# Patient Record
Sex: MALE
[De-identification: names, ages, dates, MRNs, and addresses within clinical notes are randomized per-mention and may not be internally consistent; named-entity substitution may affect disease eponyms.]

---

## 2018-06-20 ENCOUNTER — HOSPITAL ENCOUNTER (EMERGENCY)
Dept: HOSPITAL 97 - ER | Age: 38
Discharge: HOME | End: 2018-06-20
Payer: SELF-PAY

## 2018-06-20 VITALS — TEMPERATURE: 99.2 F

## 2018-06-20 VITALS — OXYGEN SATURATION: 97 % | DIASTOLIC BLOOD PRESSURE: 87 MMHG | SYSTOLIC BLOOD PRESSURE: 131 MMHG

## 2018-06-20 DIAGNOSIS — S16.1XXA: Primary | ICD-10-CM
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
## 2018-06-20 DIAGNOSIS — F17.200: ICD-10-CM

## 2018-06-20 DIAGNOSIS — E11.9: ICD-10-CM
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
## 2018-06-20 DIAGNOSIS — M25.521: ICD-10-CM

## 2018-06-20 PROCEDURE — 99284 EMERGENCY DEPT VISIT MOD MDM: CPT

## 2018-06-20 PROCEDURE — 96372 THER/PROPH/DIAG INJ SC/IM: CPT

## 2018-06-20 PROCEDURE — 72050 X-RAY EXAM NECK SPINE 4/5VWS: CPT
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
## 2018-08-20 ENCOUNTER — HOSPITAL ENCOUNTER (EMERGENCY)
Dept: HOSPITAL 97 - ER | Age: 38
Discharge: HOME | End: 2018-08-20
Payer: SELF-PAY

## 2018-08-20 VITALS — SYSTOLIC BLOOD PRESSURE: 118 MMHG | OXYGEN SATURATION: 96 % | DIASTOLIC BLOOD PRESSURE: 73 MMHG

## 2018-08-20 VITALS — TEMPERATURE: 97.8 F

## 2018-08-20 DIAGNOSIS — M79.631: ICD-10-CM
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
## 2018-08-20 DIAGNOSIS — R07.9: Primary | ICD-10-CM
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
## 2018-08-20 DIAGNOSIS — E11.9: ICD-10-CM
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
## 2018-08-20 DIAGNOSIS — M25.521: ICD-10-CM

## 2018-08-20 LAB
BLD SMEAR INTERP: (no result)
BUN BLD-MCNC: 22 MG/DL (ref 7–18)
GLUCOSE SERPLBLD-MCNC: 415 MG/DL (ref 74–106)
HCT VFR BLD CALC: 46 % (ref 39.6–49)
LYMPHOCYTES # SPEC AUTO: 0.9 K/UL (ref 0.7–4.9)
MCH RBC QN AUTO: 32.3 PG (ref 27–35)
MCV RBC: 93.9 FL (ref 80–100)
MORPHOLOGY BLD-IMP: (no result)
PMV BLD: 8.1 FL (ref 7.6–11.3)
POTASSIUM SERPL-SCNC: 3.9 MMOL/L (ref 3.5–5.1)
RBC # BLD: 4.9 M/UL (ref 4.33–5.43)
TOXIC GRANULES BLD QL SMEAR: (no result)

## 2018-08-20 PROCEDURE — 86850 RBC ANTIBODY SCREEN: CPT

## 2018-08-20 PROCEDURE — 86901 BLOOD TYPING SEROLOGIC RH(D): CPT

## 2018-08-20 PROCEDURE — 99285 EMERGENCY DEPT VISIT HI MDM: CPT

## 2018-08-20 PROCEDURE — 70450 CT HEAD/BRAIN W/O DYE: CPT

## 2018-08-20 PROCEDURE — 71260 CT THORAX DX C+: CPT

## 2018-08-20 PROCEDURE — 36415 COLL VENOUS BLD VENIPUNCTURE: CPT

## 2018-08-20 PROCEDURE — 81003 URINALYSIS AUTO W/O SCOPE: CPT

## 2018-08-20 PROCEDURE — 72125 CT NECK SPINE W/O DYE: CPT

## 2018-08-20 PROCEDURE — 86900 BLOOD TYPING SEROLOGIC ABO: CPT

## 2018-08-20 PROCEDURE — 85025 COMPLETE CBC W/AUTO DIFF WBC: CPT

## 2018-08-20 PROCEDURE — 80048 BASIC METABOLIC PNL TOTAL CA: CPT

## 2018-08-20 PROCEDURE — 74177 CT ABD & PELVIS W/CONTRAST: CPT
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
## 2018-08-20 NOTE — RAD REPORT
EXAM DESCRIPTION:

RAD - Elbow Right 3 View - 8/20/2018 5:52 am

 

CLINICAL HISTORY:  Right elbow pain

 

FINDINGS:  No fracture or dislocation is seen
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
